# Patient Record
Sex: FEMALE | Race: WHITE
[De-identification: names, ages, dates, MRNs, and addresses within clinical notes are randomized per-mention and may not be internally consistent; named-entity substitution may affect disease eponyms.]

---

## 2017-11-26 ENCOUNTER — HOSPITAL ENCOUNTER (EMERGENCY)
Dept: HOSPITAL 10 - FTE | Age: 42
Discharge: HOME | End: 2017-11-26
Payer: COMMERCIAL

## 2017-11-26 VITALS
BODY MASS INDEX: 26.39 KG/M2 | HEIGHT: 61 IN | BODY MASS INDEX: 26.39 KG/M2 | WEIGHT: 139.77 LBS | WEIGHT: 139.77 LBS | HEIGHT: 61 IN

## 2017-11-26 DIAGNOSIS — H66.91: Primary | ICD-10-CM

## 2017-11-26 DIAGNOSIS — H81.10: ICD-10-CM

## 2017-11-26 PROCEDURE — 99284 EMERGENCY DEPT VISIT MOD MDM: CPT

## 2017-11-26 NOTE — ERD
ER Documentation


Chief Complaint


Chief Complaint


bilateral ear pain today, ha & dizziness x4 days





HPI


42-year-old female, previously healthy, with a history of vertigo presents to 

the emergency department complaining of 4 days with progressive sore throat, 

cough and sensation of spinning for the last 4 days associated with bilateral 

ear pain today.  The patient has not tried any medications.  Refers subjective 

fever and mild chills, global headache and runny nose.





ROS


SYSTEMIC symptoms: Subjective fever, chills, no night sweats, no weight loss


EYE symptoms:   No blurred vision, no eye discharge


OTOLARYNGEAL symptoms:   No hearing loss.  Bilateral ear pain and sore throat


CARDIOVASCULAR symptoms:   No chest pain or discomfort, no palpitations.


PULMONARY symptoms:   No dyspnea, productive cough cough, no wheezing.


GASTROINTESTINAL symptoms:   No abdominal pain, no nausea, no vomiting, no 

diarrhea


MUSCULOSKELETAL symptoms:   No arthralgias, no muscle aches.


NEUROLOGY symptoms: No confusion, no syncope, no numbness or tingling.


SKIN:  No rashes





Medications


Home Meds


Active Scripts


Alprazolam* (Alprazolam*) 0.25 Mg Tablet, 0.25 MG PO QHS for vertigo for 10 Days

, #10 TAB


   Prov:CAMMIE CHAMBERS MD         11/26/17


Promethazine HCl/Codeine (Prometh-Codein 6.25-10 mg/5 ml) 5 Ml Syrup, 5 ML PO 

QHS for COUGH for 5 Days, #120 ML


   Prov:CAMMIE CHAMBERS MD         11/26/17


Azithromycin* (Zithromax*) 250 Mg Tablet, 250 MG PO .ZPACK AS DIRECTED, #6 TAB


   TAKE 500 MG (2 TABS) THE FIRST DAY THEN 250 MG (1 TAB) DAYS 2-5


   Prov:CAMMIE CHAMBERS MD         11/26/17





Allergies


Allergies:  


Coded Allergies:  


     No Known Allergy (Unverified , 11/26/17)





Physical Exam


Vitals





Vital Signs








  Date Time  Temp Pulse Resp B/P Pulse Ox O2 Delivery O2 Flow Rate FiO2


 


11/26/17 13:59 97.9 79 18 118/81 100   








Physical Exam


Patient is in no acute distress, vital signs stable. Alert and fully oriented. 


EYES: PERRLA, EOMI, Sclera and conjunctiva appear normal. 


EARS: Canals clear, tympanic membranes erythematous, opaque, retracted


THROAT: Erythematous oropharynx. 


NECK: Supple, No lymphadenopathy. Full ROM without pain or tenderness.


HEART:  RRR, no rubs, murmurs, clicks or gallops.


LUNGS: Mild bilateral rhonchi


ABDOMEN: Soft, non-tender without masses or hepatosplenomegaly.


EXTREMITIES: No edema bilaterally.


BACK: Full ROM, no deformity, normal back exam


NEURO: Cranial nerves grossly intact, no motor or sensory deficit





Procedures/MDM


41y/o female patient with medical history of benign positional vertigo, last 

episode 1 year ago, presents to the ED c/o spinning sensation associated with 

upper respiratory symptoms and bilateral ear pain for 4 days.  Vital signs 

stable, Physical exam unremarkable except for erythematous oropharynx, 

bilateral ear with tympanic membranes erythematous pack and retracted.  

Neurovascular exam intact.  Differential diagnosis include but not limited to: 

Benign positional vertigo, electrolyte imbalance, dehydration, upper 

respiratory infection, labyrinthitis. 


Physical examination and clinical presentation consistent most likely with 

otitis media with vertigo.


During the ED course the patient remained stable and asymptomatic.


Results and clinical impression discussed with patient who agrees with 

management. The patient is stable to be treated outpatient and will be 

discharged home with a Rx for azithromycin, promethazine plus codeine and a 

trial of alprazolam for benign positional vertigo


Side effects of prescribed medications (headache, rash, nausea, vomiting, 

diarrhea) were reviewed.


Side effects of prescribed opiates (drowsiness, habituation) were reviewed.


Side effects of prescribed NSAID medication (GI distress, edema, bleeding, HTN) 

were reviewed.





The patient was instructed to follow up with the primary care provider in the 

next 48h.  If symptoms persist, worsen or new symptoms develop, then patient 

should return to the ED immediately.





Instructions explained and given to patient in Maltese with acknowledgment and 

demonstrated understanding.





Disclaimer: Inadvertent spelling and grammatical errors are likely due to EHR/

dictation software use and do not reflect on the overall quality of patient 

care. Also, please note that the electronic time recorded on this note does not 

necessarily reflect the actual time of the patient encounter.





Departure


Diagnosis:  


 Primary Impression:  


 Right otitis media


 Additional Impression:  


 Benign positional vertigo


Condition:  Stable





Additional Instructions:  


Muchas mamadou por [facility] para bean servicio.





Esperamos que en bean visita a la marcellus de emergencia bean problema medico haya sido 

solucionado y que se sienta mucho mejor. 





Para estar seguros que bean mejoria sigue en proceso, le pedimos el favor de 

hacer edna clyde de seguimiento medico con bean doctor primario en los proximos 2-4 

de la o.





Lleve con usted estos documentos y las medicinas recetadas.





Si arielle sintomas empeoran y no puede lakeisha a bean doctor, por favor regrese a marcellus 

de emergencia.





En paul que usted no tenga un mdico de atencin primaria:


Llame al mdico o clnica comunitaria de referencia que aparece abajo [facility]berth 

las horas de consultorio para hacer edna clyde para que le vean.





CLINICAS:


[facility]  [phone] [id number] [facility]  [phone] [id number] [facility] [phone] [facility]  [phone] [id number] [place] [phone] [id number] [facility] [phone] 


[address] CAMMIE CURTIS MD Nov 26, 2017 14:33

## 2018-05-30 ENCOUNTER — HOSPITAL ENCOUNTER (EMERGENCY)
Age: 43
Discharge: HOME | End: 2018-05-30

## 2018-05-30 ENCOUNTER — HOSPITAL ENCOUNTER (EMERGENCY)
Dept: HOSPITAL 91 - FTE | Age: 43
Discharge: HOME | End: 2018-05-30
Payer: COMMERCIAL

## 2018-05-30 DIAGNOSIS — M54.5: Primary | ICD-10-CM

## 2018-05-30 LAB
URINE BLOOD (DIP) POC: (no result)
URINE PH (DIP) POC: 7.5 (ref 5–8.5)

## 2018-05-30 PROCEDURE — 96372 THER/PROPH/DIAG INJ SC/IM: CPT

## 2018-05-30 PROCEDURE — 81003 URINALYSIS AUTO W/O SCOPE: CPT

## 2018-05-30 PROCEDURE — 72100 X-RAY EXAM L-S SPINE 2/3 VWS: CPT

## 2018-05-30 PROCEDURE — 73510: CPT

## 2018-05-30 PROCEDURE — 81025 URINE PREGNANCY TEST: CPT

## 2018-05-30 PROCEDURE — 99284 EMERGENCY DEPT VISIT MOD MDM: CPT

## 2018-05-30 RX ADMIN — LORAZEPAM 1 MG: 1 TABLET ORAL at 20:54

## 2018-05-30 RX ADMIN — DEXAMETHASONE SODIUM PHOSPHATE 1 MG: 10 INJECTION, SOLUTION INTRAMUSCULAR; INTRAVENOUS at 20:54

## 2018-05-30 RX ADMIN — KETOROLAC TROMETHAMINE 1 MG: 30 INJECTION, SOLUTION INTRAMUSCULAR at 20:54
